# Patient Record
Sex: MALE | URBAN - METROPOLITAN AREA
[De-identification: names, ages, dates, MRNs, and addresses within clinical notes are randomized per-mention and may not be internally consistent; named-entity substitution may affect disease eponyms.]

---

## 2023-10-16 ENCOUNTER — DOCUMENTATION ONLY (OUTPATIENT)
Dept: SLEEP MEDICINE | Facility: CLINIC | Age: 38
End: 2023-10-16

## 2023-10-16 NOTE — PROGRESS NOTES
10/16/23- JL- JOHNS LEAKS ARE HIGH IN ARROW, I EXPLAINED HE SHOULD GO BACK TO USING FF MASK/HEADGEAR DUE TO DRY MOUTH AND LEAKS. AERIFIED NA MASK/HEADGEAR WILL BE SHIPPED OUT TO HIM, HE REFUSED ALL OTHER SUPPLIES. HE STATES HE IS NOT TAKING ANY MEDICATION THAT COULD MAKE MOUTH DRY, I TOLD HIM TO TRY LUIS, AYR OR XYILMELT TO HELP WITH DRY THROAT.